# Patient Record
Sex: FEMALE | Race: WHITE | NOT HISPANIC OR LATINO
[De-identification: names, ages, dates, MRNs, and addresses within clinical notes are randomized per-mention and may not be internally consistent; named-entity substitution may affect disease eponyms.]

---
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
## 2025-05-12 SDOH — ECONOMIC STABILITY: FOOD INSECURITY: WITHIN THE PAST 12 MONTHS, THE FOOD YOU BOUGHT JUST DIDN'T LAST AND YOU DIDN'T HAVE MONEY TO GET MORE.: NEVER TRUE

## 2025-05-12 SDOH — ECONOMIC STABILITY: HOUSING INSECURITY: DO YOU HAVE PROBLEMS WITH ANY OF THE FOLLOWING?: NONE OF THE ABOVE

## 2025-05-12 SDOH — ECONOMIC STABILITY: TRANSPORTATION INSECURITY
IN THE PAST 12 MONTHS, HAS LACK OF RELIABLE TRANSPORTATION KEPT YOU FROM MEDICAL APPOINTMENTS, MEETINGS, WORK OR FROM GETTING THINGS NEEDED FOR DAILY LIVING?: NO

## 2025-05-12 ASSESSMENT — SOCIAL DETERMINANTS OF HEALTH (SDOH): IN THE PAST 12 MONTHS, HAS THE ELECTRIC, GAS, OIL, OR WATER COMPANY THREATENED TO SHUT OFF SERVICE IN YOUR HOME?: NO

## 2025-05-14 ENCOUNTER — APPOINTMENT (OUTPATIENT)
Dept: OBGYN | Age: 20
End: 2025-05-14

## 2025-05-14 ENCOUNTER — TELEPHONE (OUTPATIENT)
Dept: OBGYN | Age: 20
End: 2025-05-14

## 2025-05-14 DIAGNOSIS — Z30.09 ENCOUNTER FOR COUNSELING REGARDING CONTRACEPTION: Primary | ICD-10-CM

## 2025-05-29 ENCOUNTER — HOSPITAL ENCOUNTER (EMERGENCY)
Facility: HOSPITAL | Age: 20
Discharge: HOME | End: 2025-05-29
Attending: EMERGENCY MEDICINE | Admitting: EMERGENCY MEDICINE
Payer: COMMERCIAL

## 2025-05-29 VITALS
TEMPERATURE: 99.6 F | HEART RATE: 73 BPM | RESPIRATION RATE: 18 BRPM | DIASTOLIC BLOOD PRESSURE: 86 MMHG | OXYGEN SATURATION: 100 % | SYSTOLIC BLOOD PRESSURE: 121 MMHG

## 2025-05-29 DIAGNOSIS — B34.9 VIRAL SYNDROME: ICD-10-CM

## 2025-05-29 DIAGNOSIS — R11.2 NAUSEA AND VOMITING: ICD-10-CM

## 2025-05-29 DIAGNOSIS — R68.89 JARISCH HERXHEIMER REACTION: Primary | ICD-10-CM

## 2025-05-29 PROCEDURE — 93005 ELECTROCARDIOGRAM TRACING: CPT

## 2025-05-29 PROCEDURE — 99284 EMERGENCY DEPT VISIT MOD MDM: CPT

## 2025-05-29 PROCEDURE — 99284 EMERGENCY DEPT VISIT MOD MDM: CPT | Performed by: EMERGENCY MEDICINE

## 2025-05-29 RX ORDER — ONDANSETRON 4 MG/1
4 TABLET, FILM COATED ORAL EVERY 6 HOURS
Qty: 12 TABLET | Refills: 0 | Status: SHIPPED | OUTPATIENT
Start: 2025-05-29

## 2025-05-29 RX ORDER — DOXYCYCLINE 100 MG/1
100 CAPSULE ORAL 2 TIMES DAILY
Qty: 6 CAPSULE | Refills: 0 | Status: SHIPPED | OUTPATIENT
Start: 2025-05-29 | End: 2025-06-01

## 2025-05-29 NOTE — Clinical Note
Irina Keen was seen and treated in our emergency department on 5/29/2025.                Diagnosis: Lyme disease with flu symptoms    Irina  may return to school on return date.    She may return on this date: 06/02/2025         If you have any questions or concerns, please don't hesitate to call.      Jeannette Keller MD    ______________________________           _______________          _______________  Hospital Representative                              Date                                Time

## 2025-05-30 ENCOUNTER — E-ADVICE (OUTPATIENT)
Dept: OBGYN | Age: 20
End: 2025-05-30

## 2025-05-30 LAB
ATRIAL RATE: 65 BPM
P AXIS: 62 DEGREES
PR INTERVAL: 130 MS
QRS AXIS: 74 DEGREES
QRSD INTERVAL: 88 MS
QT INTERVAL: 398 MS
QTC INTERVAL: 414 MS
T WAVE AXIS: 50 DEGREES
VENTRICULAR RATE: 65 BPM

## 2025-05-30 PROCEDURE — 93010 ELECTROCARDIOGRAM REPORT: CPT | Performed by: INTERNAL MEDICINE

## 2025-05-30 NOTE — DISCHARGE INSTRUCTIONS
You were seen today for flu-symptoms. 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 Please continue to take your antibiotic as prescribed. Add on the antibiotics I prescribed today for to complete 10 days total. Take Zofran as needed for nausea.

## 2025-05-31 NOTE — ED ATTENDING ATTESTATION
5/29/2025  I, Clay Summers DO, saw and evaluated the patient. I have discussed the patient with the resident/non-physician practitioner and agree with the resident's/non-physician practitioner's findings, Plan of Care, and MDM as documented in the resident's/non-physician practitioner's note, except where noted. All available labs and Radiology studies were reviewed.  I was present for key portions of any procedure(s) performed by the resident/non-physician practitioner and I was immediately available to provide assistance.       At this point I agree with the current assessment done in the Emergency Department.  I have conducted an independent evaluation of this patient a history and physical is as follows:    20-year-old female presents for generalized bodyaches flulike symptoms.  Was seen at urgent care and started doxycycline yesterday for possible Lyme although they only checked an IgG apparently.  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  Likely Jarsich Herxheimer, plan supportive care doubt sepsis also will extend doxycycline prescription to this 14 days      ED Course         Critical Care Time  Procedures

## 2025-06-03 NOTE — ED PROVIDER NOTES
Time reflects when diagnosis was documented in both MDM as applicable and the Disposition within this note       Time User Action Codes Description Comment    5/29/2025  9:29 PM Jeannette Keller [R68.89] Jarisch Herxheimer reaction     5/29/2025  9:31 PM Jeannette Keller [B34.9] Viral syndrome     5/29/2025  9:31 PM Jeannette Keller Add [R11.2] Nausea and vomiting           ED Disposition       ED Disposition   Discharge    Condition   Stable    Date/Time   u May 29, 2025  9:27 PM    Comment   Irina Osmani discharge to home/self care.                   Assessment & Plan       Medical Decision Making  Risk  Prescription drug management.      Patient is a 20 y.o. female  who presents to the ED with complaint flulike symptoms-body aches, subjective fevers, chills, decreased appetite.  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  She is not sure how long they are attached.  She went to urgent care and was started on doxycycline 100 mg twice daily for 7 days.  She is on day 2 of taking this.  Soon after starting his medication, she developed these flulike symptoms.  Denies any sick contacts.    Vital signs stable, afebrile. Exam as listed below.    Differential diagnosis includes but is not limited to -given history of likely Lyme exposure and treatment with doxycycline, believe this to be Jarisch Herxheimer reaction; may also be viral flu-like illness.     Plan -reassured patient that this is a normal reaction when taking doxycycline in the setting of Lyme disease.  Encourage compliance with doxycycline as this reaction will soon resolve.  Discussed taking Tylenol, Motrin, Zofran for symptomatic treatment.  Given patient was only prescribed 7 days of doxycycline, will provide additional prescription for 3 days to complete 10-day course.    View ED course above for further discussion on patient workup.     All labs reviewed and utilized in the medical  "decision making process  All radiology studies independently viewed by me and interpreted by the radiologist.  I reviewed all testing with the patient.     Upon re-evaluation patient stable for discharge..         Medications - No data to display    ED Risk Strat Scores              CRAFFT      Flowsheet Row Most Recent Value   CRAFFT Initial Screen: During the past 12 months, did you:    1. Drink any alcohol (more than a few sips)?  No Filed at: 05/29/2025 2038   2. Smoke any marijuana or hashish Yes Filed at: 05/29/2025 2038   3. Use anything else to get high? (\"anything else\" includes illegal drugs, over the counter and prescription drugs, and things that you sniff or 'enrique')? No Filed at: 05/29/2025 2038              No data recorded                            History of Present Illness       Chief Complaint   Patient presents with    Medical Problem     Pt c/o dizziness, SOB, fever, abd/neck/back pain, vomiting/diarrhea, \"can't focus on anything\"; went to urgent care yesterday and was told there was nothing wrong with her but she feels worse        Past Medical History[1]   Past Surgical History[2]   Family History[3]   Social History[4]   E-Cigarette/Vaping    E-Cigarette Use Never User       E-Cigarette/Vaping Substances      I have reviewed and agree with the history as documented.     20-year-old female recent history of tick exposure now with doxycycline complaining of flulike symptoms.        Review of Systems   Constitutional:  Positive for appetite change, chills, diaphoresis, fatigue and fever.   HENT:  Negative for congestion and rhinorrhea.    Respiratory:  Negative for cough and shortness of breath.    Gastrointestinal:  Positive for nausea. Negative for abdominal distention and vomiting.   Skin:  Negative for color change and rash.   Neurological:  Positive for weakness and headaches. Negative for dizziness.   Psychiatric/Behavioral:  Negative for confusion.            Objective       ED Triage " Vitals [05/29/25 2036]   Temperature Pulse Blood Pressure Respirations SpO2 Patient Position - Orthostatic VS   99.6 °F (37.6 °C) 73 121/86 18 100 % Sitting      Temp Source Heart Rate Source BP Location FiO2 (%) Pain Score    Temporal Monitor Left arm -- 7      Vitals      Date and Time Temp Pulse SpO2 Resp BP Pain Score FACES Pain Rating User   05/29/25 2036 99.6 °F (37.6 °C) 73 100 % 18 121/86 7 -- OB            Physical Exam  Constitutional:       General: She is not in acute distress.     Appearance: Normal appearance. She is not ill-appearing or diaphoretic.   HENT:      Head: Normocephalic and atraumatic.      Nose: Nose normal.      Mouth/Throat:      Mouth: Mucous membranes are moist.     Eyes:      Pupils: Pupils are equal, round, and reactive to light.       Cardiovascular:      Rate and Rhythm: Normal rate and regular rhythm.      Pulses: Normal pulses.   Pulmonary:      Effort: Pulmonary effort is normal.   Abdominal:      General: Abdomen is flat. There is no distension.      Tenderness: There is no abdominal tenderness. There is no guarding.     Musculoskeletal:      Right lower leg: No edema.      Left lower leg: No edema.     Skin:     General: Skin is warm and dry.      Capillary Refill: Capillary refill takes less than 2 seconds.     Neurological:      General: No focal deficit present.      Mental Status: She is alert and oriented to person, place, and time.         Results Reviewed       None            No orders to display       Procedures    ED Medication and Procedure Management   Prior to Admission Medications   Prescriptions Last Dose Informant Patient Reported? Taking?   FLUoxetine (PROzac) 10 mg capsule   Yes No   doxycycline hyclate (VIBRAMYCIN) 100 mg capsule   Yes No   ondansetron (ZOFRAN) 4 mg tablet   No No   Sig: Take 1 tablet (4 mg total) by mouth every 6 (six) hours   ondansetron (ZOFRAN) 4 mg tablet   No Yes   Sig: Take 1 tablet (4 mg total) by mouth every 6 (six) hours       Facility-Administered Medications: None     Discharge Medication List as of 5/29/2025  9:31 PM        START taking these medications    Details   !! doxycycline hyclate (VIBRAMYCIN) 100 mg capsule Take 1 capsule (100 mg total) by mouth 2 (two) times a day for 3 days, Starting u 5/29/2025, Until Sun 6/1/2025, Normal       !! - Potential duplicate medications found. Please discuss with provider.        CONTINUE these medications which have CHANGED    Details   ondansetron (ZOFRAN) 4 mg tablet Take 1 tablet (4 mg total) by mouth every 6 (six) hours, Starting Thu 5/29/2025, Normal           CONTINUE these medications which have NOT CHANGED    Details   !! doxycycline hyclate (VIBRAMYCIN) 100 mg capsule Historical Med      FLUoxetine (PROzac) 10 mg capsule Historical Med       !! - Potential duplicate medications found. Please discuss with provider.        No discharge procedures on file.  ED SEPSIS DOCUMENTATION   Time reflects when diagnosis was documented in both MDM as applicable and the Disposition within this note       Time User Action Codes Description Comment    5/29/2025  9:29 PM Jeannette Keller [R68.89] Jarisch Herxheimer reaction     5/29/2025  9:31 PM Jeannette Keller [B34.9] Viral syndrome     5/29/2025  9:31 PM Jeannette Keller [R11.2] Nausea and vomiting                    [1]   Past Medical History:  Diagnosis Date    Anxiety    [2] No past surgical history on file.  [3] No family history on file.  [4]   Social History  Tobacco Use    Smoking status: Never    Smokeless tobacco: Never   Vaping Use    Vaping status: Never Used   Substance Use Topics    Alcohol use: Yes     Comment: social    Drug use: Yes     Types: Marijuana        Jeannette Keller MD  06/02/25 1499

## 2025-07-23 ENCOUNTER — E-ADVICE (OUTPATIENT)
Dept: OBGYN | Age: 20
End: 2025-07-23

## 2025-08-11 ENCOUNTER — APPOINTMENT (OUTPATIENT)
Dept: OBGYN | Age: 20
End: 2025-08-11

## 2025-08-13 ENCOUNTER — APPOINTMENT (OUTPATIENT)
Dept: OBGYN | Age: 20
End: 2025-08-13